# Patient Record
Sex: FEMALE | Race: WHITE | URBAN - METROPOLITAN AREA
[De-identification: names, ages, dates, MRNs, and addresses within clinical notes are randomized per-mention and may not be internally consistent; named-entity substitution may affect disease eponyms.]

---

## 2021-10-27 PROBLEM — M06.4 INFLAMMATORY POLYARTHRITIS (HCC): Status: ACTIVE | Noted: 2019-01-16

## 2021-10-27 PROBLEM — R53.83 FATIGUE: Status: ACTIVE | Noted: 2019-01-16

## 2021-10-27 PROBLEM — N92.0 MENORRHAGIA: Status: ACTIVE | Noted: 2019-05-14

## 2021-10-27 PROBLEM — D50.9 IRON DEFICIENCY ANEMIA: Status: ACTIVE | Noted: 2019-12-04

## 2021-10-27 PROBLEM — M77.02 BILATERAL MEDIAL EPICONDYLITIS OF ELBOW JOINT: Status: ACTIVE | Noted: 2019-03-04

## 2021-10-27 PROBLEM — M77.01 BILATERAL MEDIAL EPICONDYLITIS OF ELBOW JOINT: Status: ACTIVE | Noted: 2019-03-04

## 2022-04-14 ENCOUNTER — OFFICE VISIT (OUTPATIENT)
Dept: NEUROLOGY | Facility: CLINIC | Age: 52
End: 2022-04-14
Payer: COMMERCIAL

## 2022-04-14 ENCOUNTER — TELEPHONE (OUTPATIENT)
Dept: NEUROLOGY | Facility: CLINIC | Age: 52
End: 2022-04-14

## 2022-04-14 VITALS — RESPIRATION RATE: 16 BRPM | SYSTOLIC BLOOD PRESSURE: 108 MMHG | HEART RATE: 60 BPM | DIASTOLIC BLOOD PRESSURE: 68 MMHG

## 2022-04-14 DIAGNOSIS — M79.671 PAIN IN BOTH FEET: ICD-10-CM

## 2022-04-14 DIAGNOSIS — R41.3 MEMORY CHANGE: Primary | ICD-10-CM

## 2022-04-14 DIAGNOSIS — G62.89 OTHER POLYNEUROPATHY: ICD-10-CM

## 2022-04-14 DIAGNOSIS — M79.672 PAIN IN BOTH FEET: ICD-10-CM

## 2022-04-14 PROCEDURE — 3074F SYST BP LT 130 MM HG: CPT | Performed by: OTHER

## 2022-04-14 PROCEDURE — 99204 OFFICE O/P NEW MOD 45 MIN: CPT | Performed by: OTHER

## 2022-04-14 PROCEDURE — 3078F DIAST BP <80 MM HG: CPT | Performed by: OTHER

## 2022-04-14 RX ORDER — ROSUVASTATIN CALCIUM 5 MG/1
TABLET, COATED ORAL DAILY
COMMUNITY

## 2022-04-14 NOTE — TELEPHONE ENCOUNTER
Patient brought in disc of MRI Rt Ankle DOS 2/10/22. Imaging uploaded to [employer]. Disc returned to patient.

## 2022-04-14 NOTE — PROGRESS NOTES
Patient here for evaluation of possible neuropathy in feet. Started in 6/2021. Also wanting to discuss trouble with memory.

## 2022-04-23 ENCOUNTER — HOSPITAL ENCOUNTER (OUTPATIENT)
Dept: MRI IMAGING | Age: 52
Discharge: HOME | End: 2022-04-23
Attending: Other
Payer: COMMERCIAL

## 2022-04-23 DIAGNOSIS — R41.3 MEMORY CHANGE: ICD-10-CM

## 2022-04-23 PROCEDURE — 70551 MRI BRAIN STEM W/O DYE: CPT | Performed by: OTHER
